# Patient Record
Sex: MALE | Race: OTHER | HISPANIC OR LATINO | URBAN - METROPOLITAN AREA
[De-identification: names, ages, dates, MRNs, and addresses within clinical notes are randomized per-mention and may not be internally consistent; named-entity substitution may affect disease eponyms.]

---

## 2022-03-14 PROBLEM — Z00.00 ENCOUNTER FOR PREVENTIVE HEALTH EXAMINATION: Status: ACTIVE | Noted: 2022-03-14

## 2022-03-22 ENCOUNTER — OUTPATIENT (OUTPATIENT)
Dept: OUTPATIENT SERVICES | Facility: HOSPITAL | Age: 32
LOS: 1 days | End: 2022-03-22
Payer: COMMERCIAL

## 2022-03-22 ENCOUNTER — APPOINTMENT (OUTPATIENT)
Dept: PEDIATRIC ALLERGY IMMUNOLOGY | Facility: CLINIC | Age: 32
End: 2022-03-22
Payer: COMMERCIAL

## 2022-03-22 VITALS
WEIGHT: 178 LBS | HEIGHT: 71 IN | BODY MASS INDEX: 24.92 KG/M2 | OXYGEN SATURATION: 99 % | DIASTOLIC BLOOD PRESSURE: 83 MMHG | HEART RATE: 85 BPM | SYSTOLIC BLOOD PRESSURE: 141 MMHG

## 2022-03-22 DIAGNOSIS — Z78.9 OTHER SPECIFIED HEALTH STATUS: ICD-10-CM

## 2022-03-22 DIAGNOSIS — Z00.00 ENCOUNTER FOR GENERAL ADULT MEDICAL EXAMINATION WITHOUT ABNORMAL FINDINGS: ICD-10-CM

## 2022-03-22 DIAGNOSIS — Z87.891 PERSONAL HISTORY OF NICOTINE DEPENDENCE: ICD-10-CM

## 2022-03-22 DIAGNOSIS — Z29.9 ENCOUNTER FOR PROPHYLACTIC MEASURES, UNSPECIFIED: ICD-10-CM

## 2022-03-22 DIAGNOSIS — Z83.438 FAMILY HISTORY OF OTHER DISORDER OF LIPOPROTEIN METABOLISM AND OTHER LIPIDEMIA: ICD-10-CM

## 2022-03-22 DIAGNOSIS — K21.9 GASTRO-ESOPHAGEAL REFLUX DISEASE W/OUT ESOPHAGITIS: ICD-10-CM

## 2022-03-22 PROCEDURE — 36415 COLL VENOUS BLD VENIPUNCTURE: CPT

## 2022-03-22 PROCEDURE — 99204 OFFICE O/P NEW MOD 45 MIN: CPT

## 2022-03-22 PROCEDURE — G0463: CPT

## 2022-03-22 NOTE — HISTORY OF PRESENT ILLNESS
[Not Applicable] : Not Applicable [No] : No [FreeTextEntry1] : LAURA BATES  is a 31 year  y/o HIV negative male here for a routine PrEP Initial.  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 \par \par Last HIV test:3 months ago with [facility] \par Hx of STIs: denies, full testing done end of December. \par \par Recent sexual history: Single, MSM, bottom. \par In the past 1 month 2 sexual partners and used condoms with one partner. \par Last sexual encounter was 2 weeks ago bottom w/o condom. \par Typically meets sexual partners through apps. \par \par Tattoos: denies \par IV drug use: denies \par Exchanged sex for money, drugs or housing: denies \par \par Once a  year in the winter goes to [place] and does annual physical. He prescribes omeprazole for GERD. \par \par No signs/symptoms of acute HIV. No fever, myalgias, throat pain, meningismus.

## 2022-03-22 NOTE — SOCIAL HISTORY
[Sexually Active] : The patient is sexually active [Age of First Sexual [place] ___] : became sexually active at the age of [unfilled] [Always] : always [Oral-Receptive (pt. mouth)] : oral-receptive (pt. mouth) [Anal-Receptive (pt anus)] : anal-receptive (pt anus) [To Pt Anus] : pt anus [To Pt Penis] : pt penis [Male ___] : [unfilled] male [Date of most recent sexual encounter ___] : ~His/Her~ most recent sexual encounter was [unfilled] [Partner Aware?] : Partner Aware? Yes [Monogamous] : is not monogamous [de-identified] : Bottom

## 2022-03-22 NOTE — DISCUSSION/SUMMARY
[15 min] : 15 min [HIV Education] : HIV Education [Transmission] : transmission [Universal Precautions] : universal precautions [Treatment Education] : treatment education [Anticipatory Guidance] : anticipatory guidance [Sexuality/Safer Sex] : sexuality/safer sex [Partner Notification Info/Discussion] : partner notification info/discussion [HIV info] : HIV info [Condoms] : condoms [Risk Reduction] : risk reduction [PrEP/PEP] : PrEP/PEP [The Topics Listed Above] : the topics listed above [The patient was able to ask questions and explain these concepts in his/her own words] : the patient was able to ask questions and explain these concepts in his/her own words

## 2022-03-23 LAB
25(OH)D3 SERPL-MCNC: 20.3 NG/ML
ALBUMIN SERPL ELPH-MCNC: 4.8 G/DL
ALP BLD-CCNC: 56 U/L
ALT SERPL-CCNC: 15 U/L
ANION GAP SERPL CALC-SCNC: 15 MMOL/L
APPEARANCE: CLEAR
AST SERPL-CCNC: 16 U/L
BASOPHILS # BLD AUTO: 0.01 K/UL
BASOPHILS NFR BLD AUTO: 0.3 %
BILIRUB SERPL-MCNC: 0.8 MG/DL
BILIRUBIN URINE: NEGATIVE
BLOOD URINE: NEGATIVE
BUN SERPL-MCNC: 15 MG/DL
C TRACH RRNA SPEC QL NAA+PROBE: NOT DETECTED
CALCIUM SERPL-MCNC: 9.6 MG/DL
CHLORIDE SERPL-SCNC: 102 MMOL/L
CO2 SERPL-SCNC: 22 MMOL/L
COLOR: YELLOW
CREAT SERPL-MCNC: 0.92 MG/DL
EGFR: 113 ML/MIN/1.73M2
EOSINOPHIL # BLD AUTO: 0.07 K/UL
EOSINOPHIL NFR BLD AUTO: 2.2 %
GLUCOSE QUALITATIVE U: NEGATIVE
HBV CORE IGG+IGM SER QL: NONREACTIVE
HBV SURFACE AB SERPL IA-ACNC: <3 MIU/ML
HBV SURFACE AG SER QL: NONREACTIVE
HCT VFR BLD CALC: 47.7 %
HCV AB SER QL: NONREACTIVE
HCV S/CO RATIO: 0.17 S/CO
HEPATITIS A IGG ANTIBODY: NONREACTIVE
HGB BLD-MCNC: 15.6 G/DL
HIV1 RNA # SERPL NAA+PROBE: NORMAL
HIV1 RNA # SERPL NAA+PROBE: NORMAL COPIES/ML
HIV1+2 AB SPEC QL IA.RAPID: NONREACTIVE
IMM GRANULOCYTES NFR BLD AUTO: 0 %
KETONES URINE: ABNORMAL
LEUKOCYTE ESTERASE URINE: NEGATIVE
LYMPHOCYTES # BLD AUTO: 1.29 K/UL
LYMPHOCYTES NFR BLD AUTO: 39.8 %
MAN DIFF?: NORMAL
MCHC RBC-ENTMCNC: 29.3 PG
MCHC RBC-ENTMCNC: 32.7 GM/DL
MCV RBC AUTO: 89.5 FL
MONOCYTES # BLD AUTO: 0.31 K/UL
MONOCYTES NFR BLD AUTO: 9.6 %
N GONORRHOEA RRNA SPEC QL NAA+PROBE: NOT DETECTED
NEUTROPHILS # BLD AUTO: 1.56 K/UL
NEUTROPHILS NFR BLD AUTO: 48.1 %
NITRITE URINE: NEGATIVE
PH URINE: 6.5
PLATELET # BLD AUTO: 240 K/UL
POTASSIUM SERPL-SCNC: 4.6 MMOL/L
PROT SERPL-MCNC: 7.3 G/DL
PROTEIN URINE: NORMAL
RBC # BLD: 5.33 M/UL
RBC # FLD: 12.7 %
SODIUM SERPL-SCNC: 138 MMOL/L
SOURCE AMPLIFICATION: NORMAL
SOURCE ANAL: NORMAL
SOURCE ORAL: NORMAL
SPECIFIC GRAVITY URINE: 1.03
T PALLIDUM AB SER QL IA: NEGATIVE
UROBILINOGEN URINE: NORMAL
VIRAL LOAD INTERP: NORMAL
VIRAL LOAD LOG: NORMAL LG COP/ML
WBC # FLD AUTO: 3.24 K/UL

## 2022-04-05 ENCOUNTER — TRANSCRIPTION ENCOUNTER (OUTPATIENT)
Age: 32
End: 2022-04-05

## 2022-04-19 ENCOUNTER — OUTPATIENT (OUTPATIENT)
Dept: OUTPATIENT SERVICES | Facility: HOSPITAL | Age: 32
LOS: 1 days | End: 2022-04-19
Payer: COMMERCIAL

## 2022-04-19 ENCOUNTER — APPOINTMENT (OUTPATIENT)
Dept: PEDIATRIC ALLERGY IMMUNOLOGY | Facility: CLINIC | Age: 32
End: 2022-04-19
Payer: COMMERCIAL

## 2022-04-19 DIAGNOSIS — Z00.00 ENCOUNTER FOR GENERAL ADULT MEDICAL EXAMINATION WITHOUT ABNORMAL FINDINGS: ICD-10-CM

## 2022-04-19 PROCEDURE — G0463: CPT

## 2022-04-19 PROCEDURE — 99213 OFFICE O/P EST LOW 20 MIN: CPT

## 2022-04-19 PROCEDURE — 36415 COLL VENOUS BLD VENIPUNCTURE: CPT

## 2022-04-19 NOTE — SOCIAL HISTORY
[Sexually Active] : The patient is sexually active [Age of First Sexual [place] ___] : became sexually active at the age of [unfilled] [Always] : always [Oral-Receptive (pt. mouth)] : oral-receptive (pt. mouth) [Anal-Receptive (pt anus)] : anal-receptive (pt anus) [To Pt Anus] : pt anus [To Pt Penis] : pt penis [Male ___] : [unfilled] male [Date of most recent sexual encounter ___] : ~His/Her~ most recent sexual encounter was [unfilled] [Partner Aware?] : Partner Aware? Yes [Monogamous] : is not monogamous [de-identified] : Bottom

## 2022-04-20 LAB
ALBUMIN SERPL ELPH-MCNC: 5 G/DL
ALP BLD-CCNC: 53 U/L
ALT SERPL-CCNC: 21 U/L
ANION GAP SERPL CALC-SCNC: 17 MMOL/L
APPEARANCE: CLEAR
AST SERPL-CCNC: 30 U/L
BASOPHILS # BLD AUTO: 0.01 K/UL
BASOPHILS NFR BLD AUTO: 0.3 %
BILIRUB SERPL-MCNC: 0.5 MG/DL
BILIRUBIN URINE: NEGATIVE
BLOOD URINE: NEGATIVE
BUN SERPL-MCNC: 14 MG/DL
C TRACH RRNA SPEC QL NAA+PROBE: NOT DETECTED
CALCIUM SERPL-MCNC: 9.6 MG/DL
CHLORIDE SERPL-SCNC: 104 MMOL/L
CO2 SERPL-SCNC: 20 MMOL/L
COLOR: YELLOW
CREAT SERPL-MCNC: 1.02 MG/DL
EGFR: 100 ML/MIN/1.73M2
EOSINOPHIL # BLD AUTO: 0.07 K/UL
EOSINOPHIL NFR BLD AUTO: 2.2 %
GLUCOSE QUALITATIVE U: NEGATIVE
HCT VFR BLD CALC: 50.1 %
HGB BLD-MCNC: 16 G/DL
HIV1 RNA # SERPL NAA+PROBE: NORMAL
HIV1 RNA # SERPL NAA+PROBE: NORMAL COPIES/ML
HIV1+2 AB SPEC QL IA.RAPID: NONREACTIVE
IMM GRANULOCYTES NFR BLD AUTO: 0 %
KETONES URINE: NEGATIVE
LEUKOCYTE ESTERASE URINE: NEGATIVE
LYMPHOCYTES # BLD AUTO: 1.31 K/UL
LYMPHOCYTES NFR BLD AUTO: 42 %
MAN DIFF?: NORMAL
MCHC RBC-ENTMCNC: 29.3 PG
MCHC RBC-ENTMCNC: 31.9 GM/DL
MCV RBC AUTO: 91.6 FL
MONOCYTES # BLD AUTO: 0.35 K/UL
MONOCYTES NFR BLD AUTO: 11.2 %
N GONORRHOEA RRNA SPEC QL NAA+PROBE: NOT DETECTED
NEUTROPHILS # BLD AUTO: 1.38 K/UL
NEUTROPHILS NFR BLD AUTO: 44.3 %
NITRITE URINE: NEGATIVE
PH URINE: 6
PLATELET # BLD AUTO: 258 K/UL
POTASSIUM SERPL-SCNC: 4.4 MMOL/L
PROT SERPL-MCNC: 7.6 G/DL
PROTEIN URINE: NORMAL
RBC # BLD: 5.47 M/UL
RBC # FLD: 13 %
SODIUM SERPL-SCNC: 141 MMOL/L
SOURCE AMPLIFICATION: NORMAL
SOURCE ANAL: NORMAL
SOURCE ORAL: NORMAL
SPECIFIC GRAVITY URINE: 1.03
T PALLIDUM AB SER QL IA: NEGATIVE
UROBILINOGEN URINE: NORMAL
VIRAL LOAD INTERP: NORMAL
VIRAL LOAD LOG: NORMAL LG COP/ML
WBC # FLD AUTO: 3.12 K/UL

## 2022-04-21 DIAGNOSIS — Z29.9 ENCOUNTER FOR PROPHYLACTIC MEASURES, UNSPECIFIED: ICD-10-CM

## 2022-07-20 ENCOUNTER — OUTPATIENT (OUTPATIENT)
Dept: OUTPATIENT SERVICES | Facility: HOSPITAL | Age: 32
LOS: 1 days | End: 2022-07-20
Payer: COMMERCIAL

## 2022-07-20 ENCOUNTER — APPOINTMENT (OUTPATIENT)
Dept: PEDIATRIC ALLERGY IMMUNOLOGY | Facility: CLINIC | Age: 32
End: 2022-07-20

## 2022-07-20 VITALS — HEART RATE: 86 BPM | OXYGEN SATURATION: 98 % | SYSTOLIC BLOOD PRESSURE: 135 MMHG | DIASTOLIC BLOOD PRESSURE: 91 MMHG

## 2022-07-20 DIAGNOSIS — Z00.00 ENCOUNTER FOR GENERAL ADULT MEDICAL EXAMINATION WITHOUT ABNORMAL FINDINGS: ICD-10-CM

## 2022-07-20 PROCEDURE — 99215 OFFICE O/P EST HI 40 MIN: CPT

## 2022-07-20 PROCEDURE — 36415 COLL VENOUS BLD VENIPUNCTURE: CPT

## 2022-07-20 PROCEDURE — G0463: CPT

## 2022-07-20 NOTE — SOCIAL HISTORY
[Monogamous] : monogamous [Sometimes] : sometimes [Oral-Receptive (pt. mouth)] : oral-receptive (pt. mouth) [Anal-Receptive (pt anus)] : anal-receptive (pt anus) [To Pt Genitalia] : pt genitalia [Male ___] : [unfilled] male [Date of most recent sexual encounter ___] : ~His/Her~ most recent sexual encounter was [unfilled] [Partnership for Health – Safe Sex Evidence Based Intervention] : The Partnership for Health Safe Sex Evidence Based Intervention was applied [Positive Reinforcement of Safe Behavior Message] : and a positive reinforcement of safe behavior message was provided. [de-identified] : MSM, bottom only  (dating 1 mo - monogiomous x 1 mo)

## 2022-07-20 NOTE — HISTORY OF PRESENT ILLNESS
[FreeTextEntry1] : LAURA BATES is a 31 year old, male seen on 07/20/2022 for  PrEP\par \par Pt had a problem with generic truvda and was able to get Descovy name brand with a copay assistance card. \par \par Adherance 100% no missed doses.  No side effects. \par Feels well.\par \par Thiniiing about monkeypox. no vaccine for now. \par \par

## 2022-07-21 DIAGNOSIS — Z29.9 ENCOUNTER FOR PROPHYLACTIC MEASURES, UNSPECIFIED: ICD-10-CM

## 2022-07-21 LAB
ALBUMIN SERPL ELPH-MCNC: 4.7 G/DL
ALP BLD-CCNC: 69 U/L
ALT SERPL-CCNC: 15 U/L
ANION GAP SERPL CALC-SCNC: 13 MMOL/L
APPEARANCE: CLEAR
AST SERPL-CCNC: 21 U/L
BILIRUB SERPL-MCNC: 0.2 MG/DL
BILIRUBIN URINE: NEGATIVE
BLOOD URINE: NEGATIVE
BUN SERPL-MCNC: 15 MG/DL
C TRACH RRNA SPEC QL NAA+PROBE: NOT DETECTED
C TRACH RRNA SPEC QL NAA+PROBE: NOT DETECTED
CALCIUM SERPL-MCNC: 9.7 MG/DL
CHLORIDE SERPL-SCNC: 103 MMOL/L
CO2 SERPL-SCNC: 22 MMOL/L
COLOR: YELLOW
CREAT SERPL-MCNC: 0.94 MG/DL
EGFR: 110 ML/MIN/1.73M2
GLUCOSE QUALITATIVE U: NEGATIVE
GLUCOSE SERPL-MCNC: 105 MG/DL
HIV1+2 AB SPEC QL IA.RAPID: NONREACTIVE
KETONES URINE: NEGATIVE
LEUKOCYTE ESTERASE URINE: NEGATIVE
N GONORRHOEA RRNA SPEC QL NAA+PROBE: NOT DETECTED
N GONORRHOEA RRNA SPEC QL NAA+PROBE: NOT DETECTED
NITRITE URINE: NEGATIVE
PH URINE: 6.5
POTASSIUM SERPL-SCNC: 4.1 MMOL/L
PROT SERPL-MCNC: 7.6 G/DL
PROTEIN URINE: NEGATIVE
SODIUM SERPL-SCNC: 138 MMOL/L
SOURCE AMPLIFICATION: NORMAL
SOURCE ANAL: NORMAL
SPECIFIC GRAVITY URINE: 1.02
T PALLIDUM AB SER QL IA: NEGATIVE
UROBILINOGEN URINE: NORMAL

## 2022-07-22 LAB
C TRACH RRNA SPEC QL NAA+PROBE: NOT DETECTED
HIV1 RNA # SERPL NAA+PROBE: NORMAL
HIV1 RNA # SERPL NAA+PROBE: NORMAL COPIES/ML
N GONORRHOEA RRNA SPEC QL NAA+PROBE: NOT DETECTED
SOURCE ORAL: NORMAL
VIRAL LOAD INTERP: NORMAL
VIRAL LOAD LOG: NORMAL LG COP/ML

## 2022-08-03 NOTE — HISTORY OF PRESENT ILLNESS
[Excellent] : Excellent [Percent Adherence: _____ %] : [unfilled]% adherence [No] : No [FreeTextEntry1] : LAURA BATES is a 31 year old, male seen on 04/19/2022 for  PrEP 1 month f/u. \par \par Adherence: Taking PrEP everyday, no missed doses. Taking in the afternoon. 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 \par \par Recent sexual history: Single, MSM, bottom. \par In the past 1 month no new sexual partners. \par Last sexual encounter was 1.5 months ago. \par Typically meets sexual partners through apps. \par \par Once a  year in the winter goes to [place] and does annual physical. He prescribes omeprazole for GERD. \par \par No signs/symptoms of acute HIV. No fever, myalgias, throat pain, meningismus.  yes

## 2022-08-18 ENCOUNTER — RX RENEWAL (OUTPATIENT)
Age: 32
End: 2022-08-18

## 2022-12-08 ENCOUNTER — RX RENEWAL (OUTPATIENT)
Age: 32
End: 2022-12-08

## 2022-12-08 ENCOUNTER — APPOINTMENT (OUTPATIENT)
Dept: PEDIATRIC ALLERGY IMMUNOLOGY | Facility: CLINIC | Age: 32
End: 2022-12-08

## 2023-01-04 ENCOUNTER — APPOINTMENT (OUTPATIENT)
Dept: PEDIATRIC ALLERGY IMMUNOLOGY | Facility: CLINIC | Age: 33
End: 2023-01-04
Payer: COMMERCIAL

## 2023-01-04 ENCOUNTER — OUTPATIENT (OUTPATIENT)
Dept: OUTPATIENT SERVICES | Facility: HOSPITAL | Age: 33
LOS: 1 days | End: 2023-01-04
Payer: COMMERCIAL

## 2023-01-04 VITALS
TEMPERATURE: 98 F | HEIGHT: 71 IN | WEIGHT: 168 LBS | OXYGEN SATURATION: 100 % | HEART RATE: 95 BPM | BODY MASS INDEX: 23.52 KG/M2 | DIASTOLIC BLOOD PRESSURE: 82 MMHG | SYSTOLIC BLOOD PRESSURE: 114 MMHG

## 2023-01-04 DIAGNOSIS — Z00.00 ENCOUNTER FOR GENERAL ADULT MEDICAL EXAMINATION WITHOUT ABNORMAL FINDINGS: ICD-10-CM

## 2023-01-04 DIAGNOSIS — Z78.9 OTHER SPECIFIED HEALTH STATUS: ICD-10-CM

## 2023-01-04 PROCEDURE — 99215 OFFICE O/P EST HI 40 MIN: CPT

## 2023-01-04 PROCEDURE — 87491 CHLMYD TRACH DNA AMP PROBE: CPT

## 2023-01-04 PROCEDURE — 82306 VITAMIN D 25 HYDROXY: CPT

## 2023-01-04 PROCEDURE — 87591 N.GONORRHOEAE DNA AMP PROB: CPT

## 2023-01-04 PROCEDURE — 87536 HIV-1 QUANT&REVRSE TRNSCRPJ: CPT

## 2023-01-04 PROCEDURE — G0463: CPT

## 2023-01-04 PROCEDURE — 86708 HEPATITIS A ANTIBODY: CPT

## 2023-01-04 PROCEDURE — 85025 COMPLETE CBC W/AUTO DIFF WBC: CPT

## 2023-01-04 PROCEDURE — 90715 TDAP VACCINE 7 YRS/> IM: CPT

## 2023-01-04 PROCEDURE — 90471 IMMUNIZATION ADMIN: CPT

## 2023-01-04 PROCEDURE — 80061 LIPID PANEL: CPT

## 2023-01-04 PROCEDURE — 86780 TREPONEMA PALLIDUM: CPT

## 2023-01-04 PROCEDURE — 86803 HEPATITIS C AB TEST: CPT

## 2023-01-04 PROCEDURE — 80053 COMPREHEN METABOLIC PANEL: CPT

## 2023-01-04 PROCEDURE — 84443 ASSAY THYROID STIM HORMONE: CPT

## 2023-01-04 PROCEDURE — 86706 HEP B SURFACE ANTIBODY: CPT

## 2023-01-04 PROCEDURE — 83036 HEMOGLOBIN GLYCOSYLATED A1C: CPT

## 2023-01-04 PROCEDURE — 87340 HEPATITIS B SURFACE AG IA: CPT

## 2023-01-04 PROCEDURE — 36415 COLL VENOUS BLD VENIPUNCTURE: CPT

## 2023-01-04 PROCEDURE — 86704 HEP B CORE ANTIBODY TOTAL: CPT

## 2023-01-04 PROCEDURE — 87389 HIV-1 AG W/HIV-1&-2 AB AG IA: CPT

## 2023-01-04 NOTE — SOCIAL HISTORY
[Monogamous] : monogamous [Sometimes] : sometimes [Oral-Receptive (pt. mouth)] : oral-receptive (pt. mouth) [Anal-Receptive (pt anus)] : anal-receptive (pt anus) [To Pt Genitalia] : pt genitalia [Male ___] : [unfilled] male [Date of most recent sexual encounter ___] : ~His/Her~ most recent sexual encounter was [unfilled] [Partnership for Health – Safe Sex Evidence Based Intervention] : The Partnership for Health Safe Sex Evidence Based Intervention was applied [Positive Reinforcement of Safe Behavior Message] : and a positive reinforcement of safe behavior message was provided. [de-identified] : MSM, bottom only  (dating 1 mo - monogiomous x 1 mo)

## 2023-01-04 NOTE — DISCUSSION/SUMMARY
[15 min] : 15 min [HIV Education] : HIV Education [Universal Precautions] : universal precautions [Treatment Education] : treatment education [Treatment Adherence] : treatment adherence [Anticipatory Guidance] : anticipatory guidance [Prognosis] : prognosis [Risk Reduction] : risk reduction [Pre-conception Counseling] : pre-conception counseling [Condoms] : condoms [PrEP/PEP] : PrEP/PEP

## 2023-01-04 NOTE — HISTORY OF PRESENT ILLNESS
[Excellent] : Excellent [No] : No [FreeTextEntry1] : LAURA BATES is a 32 year old, male seen on 01/04/2023 for  PrEp followup. \par \par Using  Descovy name brand with a copay assistance card. \par \par Adherence 100% no missed doses.  No side effects. \par \par Stopped takign it 15 days ago.   Pt kept getting 1 mo supply renewed.  Pt last seen in July 2022 and should not have had Rx renewed but it was renewed in error without labs; pt understands he needs to be seen every 3 mo going forward for labs at least.    Feels well.\par \par Thinking about monkeypox. no vaccine for now.   Refused Influenza vaccine.\par Interested in Tdap vaccine. \par \par Exercising daily 1.5 to 2 hrs a day both weights and cardio.  5'x a week

## 2023-01-05 DIAGNOSIS — Z29.9 ENCOUNTER FOR PROPHYLACTIC MEASURES, UNSPECIFIED: ICD-10-CM

## 2023-01-06 DIAGNOSIS — E78.00 PURE HYPERCHOLESTEROLEMIA, UNSPECIFIED: ICD-10-CM

## 2023-01-06 LAB
25(OH)D3 SERPL-MCNC: 24.4 NG/ML
ALBUMIN SERPL ELPH-MCNC: 4.7 G/DL
ALP BLD-CCNC: 54 U/L
ALT SERPL-CCNC: 21 U/L
ANAL PAP CYTOLOGY: NORMAL
ANION GAP SERPL CALC-SCNC: 12 MMOL/L
APPEARANCE: CLEAR
AST SERPL-CCNC: 22 U/L
BASOPHILS # BLD AUTO: 0.02 K/UL
BASOPHILS NFR BLD AUTO: 0.4 %
BILIRUB SERPL-MCNC: 0.3 MG/DL
BILIRUBIN URINE: NEGATIVE
BLOOD URINE: NEGATIVE
BUN SERPL-MCNC: 16 MG/DL
C TRACH RRNA SPEC QL NAA+PROBE: NOT DETECTED
CALCIUM SERPL-MCNC: 9.8 MG/DL
CHLORIDE SERPL-SCNC: 102 MMOL/L
CHOLEST SERPL-MCNC: 213 MG/DL
CO2 SERPL-SCNC: 25 MMOL/L
COLOR: COLORLESS
CREAT SERPL-MCNC: 0.92 MG/DL
EGFR: 113 ML/MIN/1.73M2
EOSINOPHIL # BLD AUTO: 0.08 K/UL
EOSINOPHIL NFR BLD AUTO: 1.6 %
ESTIMATED AVERAGE GLUCOSE: 105 MG/DL
GLUCOSE QUALITATIVE U: NEGATIVE
GLUCOSE SERPL-MCNC: 94 MG/DL
HBA1C MFR BLD HPLC: 5.3 %
HBV CORE IGG+IGM SER QL: NONREACTIVE
HBV SURFACE AB SERPL IA-ACNC: <3 MIU/ML
HBV SURFACE AG SER QL: NONREACTIVE
HCT VFR BLD CALC: 50 %
HCV AB SER QL: NONREACTIVE
HCV S/CO RATIO: 0.16 S/CO
HDLC SERPL-MCNC: 52 MG/DL
HEPATITIS A IGG ANTIBODY: NONREACTIVE
HGB BLD-MCNC: 16 G/DL
HIV1 RNA # SERPL NAA+PROBE: NORMAL
HIV1 RNA # SERPL NAA+PROBE: NORMAL COPIES/ML
HIV1+2 AB SPEC QL IA.RAPID: NONREACTIVE
IMM GRANULOCYTES NFR BLD AUTO: 0.4 %
KETONES URINE: NEGATIVE
LDLC SERPL CALC-MCNC: 141 MG/DL
LEUKOCYTE ESTERASE URINE: NEGATIVE
LYMPHOCYTES # BLD AUTO: 1.49 K/UL
LYMPHOCYTES NFR BLD AUTO: 30 %
MAN DIFF?: NORMAL
MCHC RBC-ENTMCNC: 30.4 PG
MCHC RBC-ENTMCNC: 32 GM/DL
MCV RBC AUTO: 95.1 FL
MONOCYTES # BLD AUTO: 0.33 K/UL
MONOCYTES NFR BLD AUTO: 6.7 %
N GONORRHOEA RRNA SPEC QL NAA+PROBE: NOT DETECTED
NEUTROPHILS # BLD AUTO: 3.02 K/UL
NEUTROPHILS NFR BLD AUTO: 60.9 %
NITRITE URINE: NEGATIVE
NONHDLC SERPL-MCNC: 160 MG/DL
PH URINE: 6.5
PLATELET # BLD AUTO: 295 K/UL
POTASSIUM SERPL-SCNC: 4.3 MMOL/L
PROT SERPL-MCNC: 7.5 G/DL
PROTEIN URINE: NEGATIVE
RBC # BLD: 5.26 M/UL
RBC # FLD: 12.4 %
SODIUM SERPL-SCNC: 139 MMOL/L
SOURCE AMPLIFICATION: NORMAL
SOURCE ANAL: NORMAL
SOURCE ORAL: NORMAL
SPECIFIC GRAVITY URINE: 1
T PALLIDUM AB SER QL IA: NEGATIVE
TRIGL SERPL-MCNC: 95 MG/DL
TSH SERPL-ACNC: 1.26 UIU/ML
UROBILINOGEN URINE: NORMAL
VIRAL LOAD INTERP: NORMAL
VIRAL LOAD LOG: NORMAL LG COP/ML
WBC # FLD AUTO: 4.96 K/UL

## 2023-04-11 ENCOUNTER — OUTPATIENT (OUTPATIENT)
Dept: OUTPATIENT SERVICES | Facility: HOSPITAL | Age: 33
LOS: 1 days | End: 2023-04-11
Payer: COMMERCIAL

## 2023-04-11 ENCOUNTER — APPOINTMENT (OUTPATIENT)
Dept: PEDIATRIC ALLERGY IMMUNOLOGY | Facility: CLINIC | Age: 33
End: 2023-04-11
Payer: COMMERCIAL

## 2023-04-11 VITALS
SYSTOLIC BLOOD PRESSURE: 134 MMHG | HEART RATE: 80 BPM | WEIGHT: 168 LBS | DIASTOLIC BLOOD PRESSURE: 87 MMHG | BODY MASS INDEX: 23.52 KG/M2 | HEIGHT: 71 IN | OXYGEN SATURATION: 97 %

## 2023-04-11 DIAGNOSIS — Z00.00 ENCOUNTER FOR GENERAL ADULT MEDICAL EXAMINATION WITHOUT ABNORMAL FINDINGS: ICD-10-CM

## 2023-04-11 PROCEDURE — 87536 HIV-1 QUANT&REVRSE TRNSCRPJ: CPT

## 2023-04-11 PROCEDURE — 90739 HEPB VACC 2/4 DOSE ADULT IM: CPT

## 2023-04-11 PROCEDURE — 99214 OFFICE O/P EST MOD 30 MIN: CPT

## 2023-04-11 PROCEDURE — 86780 TREPONEMA PALLIDUM: CPT

## 2023-04-11 PROCEDURE — 87591 N.GONORRHOEAE DNA AMP PROB: CPT

## 2023-04-11 PROCEDURE — 85025 COMPLETE CBC W/AUTO DIFF WBC: CPT

## 2023-04-11 PROCEDURE — 90651 9VHPV VACCINE 2/3 DOSE IM: CPT

## 2023-04-11 PROCEDURE — 87389 HIV-1 AG W/HIV-1&-2 AB AG IA: CPT

## 2023-04-11 PROCEDURE — 87491 CHLMYD TRACH DNA AMP PROBE: CPT

## 2023-04-11 PROCEDURE — G0463: CPT

## 2023-04-11 PROCEDURE — 36415 COLL VENOUS BLD VENIPUNCTURE: CPT

## 2023-04-11 RX ORDER — OMEPRAZOLE 20 MG/1
20 CAPSULE, DELAYED RELEASE ORAL
Refills: 0 | Status: COMPLETED | COMMUNITY
End: 2023-04-11

## 2023-04-11 NOTE — SOCIAL HISTORY
[Sexually Active] : The patient is sexually active [Age of First Sexual [place] ___] : became sexually active at the age of [unfilled] [Always] : always [Oral-Receptive (pt. mouth)] : oral-receptive (pt. mouth) [Anal-Receptive (pt anus)] : anal-receptive (pt anus) [To Pt Anus] : pt anus [To Pt Penis] : pt penis [Partner Aware?] : Partner Aware? Yes [Male ___] : [unfilled] male [Date of most recent sexual encounter ___] : ~His/Her~ most recent sexual encounter was [unfilled] [Monogamous] : is not monogamous [de-identified] : Bottom

## 2023-04-11 NOTE — HISTORY OF PRESENT ILLNESS
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 Liked dogs most to start but now likes cats\par \par Recent sexual history: Single, MSM, bottom.  In the past 3 months 3 sexual partners. Last sexual encounter was 1.5 weeks ago\par \par Once a  year in the winter goes to [place] and does annual physical. He prescribes omeprazole for GERD. \par \par Patient reports he cold last week, covid negative. He reports 4-5 days had nasal congestion, resolved \par \par No signs/symptoms of acute HIV. No fever, myalgias, throat pain, meningismus. \par Pt denies any SOB, cough, chest pain, palpitations, N/V/D/C, fever, or chills. No other health concerns today.

## 2023-04-11 NOTE — ASSESSMENT
[FreeTextEntry1] : \par Blood work was collected in office at time of visit. \par \par HPV and HBV vaccine #1 given at time of visit - counseled. #2 given in 1 month, #3 given at 6 months. Advised patient to call with any questions or concerns. Patient verbalized understanding. \par \par HTN: bp elevated today, fmh of htn. Patient reports stopping med d/t lifestyle modifications, advised to restart. Sent in rx. Will order routine blood work and f/u results to patient once made available. F/u 3 months. Advised patient to call with any questions or concerns. Patient verbalized understanding. \par \par Discussed abnormal pap smear with pt, to repeat again in 6 months. Advised patient to call with any questions or concerns. Patient verbalized understanding. \par \par Discussed with patient HPV vaccine is approved for up to age 45, men and women. HPV vaccine covers dangerous strains of HPV that cause cervical cancer, anal cancers and a significant proportion of oropharyngeal cancer, vulvar and vaginal cancer, penile cancer along with anogenital warts.\par \par Counseled patient it is best to get vaccine at younger age prior to sexual intercourse/multiple sexual partners, but can still provide coverage against HPV even after this. It is not contraindicated if you have had HPV before, can offer protection against these strains and other strains you may not have been exposed to.\par \par Side effects include injection site redness and soreness, cannot get if you have a yeast allergy.\par \par Advised patient to call with questions/concerns. Patient verbalized understanding.\par \par \par \par \par Individuals initiating the vaccine series at 15 years of age or older - Three doses of HPV vaccine should be given at 0, 1 to 2 (typically 2), and 6 months.\par \par Missed doses - Patients often do not follow up for their immunizations on schedule. The ACIP recommends that if the vaccination series is interrupted for any length of time, it can be resumed without restarting the series.\par \par If patient had quadrivalent Gardasil HPV vaccine in past, it is okay to finish series with Gardasil-9.

## 2023-04-11 NOTE — PHYSICAL EXAM
[Alert] : alert [Well Nourished] : well nourished [Healthy Appearance] : healthy appearance [No Acute Distress] : no acute distress [Well Developed] : well developed [Normal Pupil & Iris Size/Symmetry] : normal pupil and iris size and symmetry [No Discharge] : no discharge [No Photophobia] : no photophobia [Sclera Not Icteric] : sclera not icteric [No LAD] : no lymphadenopathy [Supple] : the neck was supple [Normal Rate and Effort] : normal respiratory rhythm and effort [No Crackles] : no crackles [No Retractions] : no retractions [Bilateral Audible Breath Sounds] : bilateral audible breath sounds [Normal Rate] : heart rate was normal  [Normal S1, S2] : normal S1 and S2 [No murmur] : no murmur [Regular Rhythm] : with a regular rhythm [Soft] : abdomen soft [Not Tender] : non-tender [Not Distended] : not distended [No HSM] : no hepato-splenomegaly [Normal Cervical Lymph Nodes] : cervical [Skin Intact] : skin intact  [No Rash] : no rash [No Skin Lesions] : no skin lesions [No clubbing] : no clubbing [No Edema] : no edema [No Cyanosis] : no cyanosis [Normal Mood] : mood was normal [Normal Affect] : affect was normal [Alert, Awake, Oriented as Age-Appropriate] : alert, awake, oriented as age appropriate

## 2023-04-12 ENCOUNTER — TRANSCRIPTION ENCOUNTER (OUTPATIENT)
Age: 33
End: 2023-04-12

## 2023-04-12 DIAGNOSIS — E87.5 HYPERKALEMIA: ICD-10-CM

## 2023-04-12 DIAGNOSIS — Z29.9 ENCOUNTER FOR PROPHYLACTIC MEASURES, UNSPECIFIED: ICD-10-CM

## 2023-04-12 DIAGNOSIS — Z11.3 ENCOUNTER FOR SCREENING FOR INFECTIONS WITH A PREDOMINANTLY SEXUAL MODE OF TRANSMISSION: ICD-10-CM

## 2023-04-12 LAB
ALBUMIN SERPL ELPH-MCNC: 4.9 G/DL
ALP BLD-CCNC: 58 U/L
ALT SERPL-CCNC: 11 U/L
ANION GAP SERPL CALC-SCNC: 16 MMOL/L
APPEARANCE: CLEAR
AST SERPL-CCNC: 17 U/L
BASOPHILS # BLD AUTO: 0.02 K/UL
BASOPHILS NFR BLD AUTO: 0.4 %
BILIRUB SERPL-MCNC: 0.3 MG/DL
BILIRUBIN URINE: NEGATIVE
BLOOD URINE: NEGATIVE
BUN SERPL-MCNC: 14 MG/DL
C TRACH RRNA SPEC QL NAA+PROBE: NOT DETECTED
CALCIUM SERPL-MCNC: 10.1 MG/DL
CHLORIDE SERPL-SCNC: 101 MMOL/L
CO2 SERPL-SCNC: 22 MMOL/L
COLOR: COLORLESS
CREAT SERPL-MCNC: 0.85 MG/DL
EGFR: 118 ML/MIN/1.73M2
EOSINOPHIL # BLD AUTO: 0.06 K/UL
EOSINOPHIL NFR BLD AUTO: 1.3 %
GLUCOSE QUALITATIVE U: NEGATIVE
HCT VFR BLD CALC: 49 %
HGB BLD-MCNC: 15.6 G/DL
HIV1 RNA # SERPL NAA+PROBE: NORMAL
HIV1 RNA # SERPL NAA+PROBE: NORMAL COPIES/ML
HIV1+2 AB SPEC QL IA.RAPID: NONREACTIVE
IMM GRANULOCYTES NFR BLD AUTO: 0.2 %
KETONES URINE: NEGATIVE
LEUKOCYTE ESTERASE URINE: NEGATIVE
LYMPHOCYTES # BLD AUTO: 1.21 K/UL
LYMPHOCYTES NFR BLD AUTO: 26.2 %
MAN DIFF?: NORMAL
MCHC RBC-ENTMCNC: 30.2 PG
MCHC RBC-ENTMCNC: 31.8 GM/DL
MCV RBC AUTO: 95 FL
MONOCYTES # BLD AUTO: 0.34 K/UL
MONOCYTES NFR BLD AUTO: 7.4 %
N GONORRHOEA RRNA SPEC QL NAA+PROBE: NOT DETECTED
NEUTROPHILS # BLD AUTO: 2.98 K/UL
NEUTROPHILS NFR BLD AUTO: 64.5 %
NITRITE URINE: NEGATIVE
PH URINE: 7
PLATELET # BLD AUTO: 250 K/UL
POTASSIUM SERPL-SCNC: 5.5 MMOL/L
PROT SERPL-MCNC: 7.5 G/DL
PROTEIN URINE: NEGATIVE
RBC # BLD: 5.16 M/UL
RBC # FLD: 12.5 %
SODIUM SERPL-SCNC: 140 MMOL/L
SOURCE AMPLIFICATION: NORMAL
SPECIFIC GRAVITY URINE: 1.01
T PALLIDUM AB SER QL IA: NEGATIVE
UROBILINOGEN URINE: NORMAL
VIRAL LOAD INTERP: NORMAL
VIRAL LOAD LOG: NORMAL LG COP/ML
WBC # FLD AUTO: 4.62 K/UL

## 2023-04-13 LAB
C TRACH RRNA SPEC QL NAA+PROBE: NOT DETECTED
C TRACH RRNA SPEC QL NAA+PROBE: NOT DETECTED
N GONORRHOEA RRNA SPEC QL NAA+PROBE: NOT DETECTED
N GONORRHOEA RRNA SPEC QL NAA+PROBE: NOT DETECTED
SOURCE ANAL: NORMAL
SOURCE ORAL: NORMAL

## 2023-05-03 ENCOUNTER — OUTPATIENT (OUTPATIENT)
Dept: OUTPATIENT SERVICES | Facility: HOSPITAL | Age: 33
LOS: 1 days | End: 2023-05-03
Payer: COMMERCIAL

## 2023-05-03 ENCOUNTER — APPOINTMENT (OUTPATIENT)
Dept: PEDIATRIC ALLERGY IMMUNOLOGY | Facility: CLINIC | Age: 33
End: 2023-05-03
Payer: COMMERCIAL

## 2023-05-03 VITALS
HEIGHT: 71 IN | DIASTOLIC BLOOD PRESSURE: 80 MMHG | WEIGHT: 165 LBS | OXYGEN SATURATION: 99 % | BODY MASS INDEX: 23.1 KG/M2 | TEMPERATURE: 98.2 F | SYSTOLIC BLOOD PRESSURE: 133 MMHG | HEART RATE: 79 BPM

## 2023-05-03 DIAGNOSIS — L02.31 CUTANEOUS ABSCESS OF BUTTOCK: ICD-10-CM

## 2023-05-03 DIAGNOSIS — L73.9 FOLLICULAR DISORDER, UNSPECIFIED: ICD-10-CM

## 2023-05-03 DIAGNOSIS — R36.9 URETHRAL DISCHARGE, UNSPECIFIED: ICD-10-CM

## 2023-05-03 DIAGNOSIS — Z00.00 ENCOUNTER FOR GENERAL ADULT MEDICAL EXAMINATION WITHOUT ABNORMAL FINDINGS: ICD-10-CM

## 2023-05-03 PROCEDURE — 36415 COLL VENOUS BLD VENIPUNCTURE: CPT

## 2023-05-03 PROCEDURE — 87591 N.GONORRHOEAE DNA AMP PROB: CPT

## 2023-05-03 PROCEDURE — 99215 OFFICE O/P EST HI 40 MIN: CPT

## 2023-05-03 PROCEDURE — 81001 URINALYSIS AUTO W/SCOPE: CPT

## 2023-05-03 PROCEDURE — 80048 BASIC METABOLIC PNL TOTAL CA: CPT

## 2023-05-03 PROCEDURE — G0463: CPT

## 2023-05-03 PROCEDURE — 87536 HIV-1 QUANT&REVRSE TRNSCRPJ: CPT

## 2023-05-03 PROCEDURE — 87491 CHLMYD TRACH DNA AMP PROBE: CPT

## 2023-05-03 PROCEDURE — 80074 ACUTE HEPATITIS PANEL: CPT

## 2023-05-03 PROCEDURE — 87389 HIV-1 AG W/HIV-1&-2 AB AG IA: CPT

## 2023-05-03 PROCEDURE — 86780 TREPONEMA PALLIDUM: CPT

## 2023-05-03 NOTE — SOCIAL HISTORY
[Monogamous] : monogamous [Sometimes] : sometimes [Oral-Receptive (pt. mouth)] : oral-receptive (pt. mouth) [Anal-Receptive (pt anus)] : anal-receptive (pt anus) [To Pt Genitalia] : pt genitalia [Male ___] : [unfilled] male [Date of most recent sexual encounter ___] : ~His/Her~ most recent sexual encounter was [unfilled] [Partnership for Health – Safe Sex Evidence Based Intervention] : The Partnership for Health Safe Sex Evidence Based Intervention was applied [Positive Reinforcement of Safe Behavior Message] : and a positive reinforcement of safe behavior message was provided. [de-identified] : MSM, bottom only  (dating 1 mo - monogiomous x 1 mo)

## 2023-05-03 NOTE — PHYSICAL EXAM
[Alert] : alert [Well Nourished] : well nourished [Healthy Appearance] : healthy appearance [No Acute Distress] : no acute distress [Well Developed] : well developed [Normal Pupil & Iris Size/Symmetry] : normal pupil and iris size and symmetry [No Discharge] : no discharge [No Photophobia] : no photophobia [Sclera Not Icteric] : sclera not icteric [Normal TMs] : both tympanic membranes were normal [Normal Nasal Mucosa] : the nasal mucosa was normal [Normal Lips/Tongue] : the lips and tongue were normal [Normal Outer Ear/Nose] : the ears and nose were normal in appearance [Normal Tonsils] : normal tonsils [No Thrush] : no thrush [Pale mucosa] : pale mucosa [Supple] : the neck was supple [Normal Rate and Effort] : normal respiratory rhythm and effort [No Crackles] : no crackles [No Retractions] : no retractions [Bilateral Audible Breath Sounds] : bilateral audible breath sounds [Normal Rate] : heart rate was normal  [Normal S1, S2] : normal S1 and S2 [No murmur] : no murmur [Regular Rhythm] : with a regular rhythm [Soft] : abdomen soft [Not Tender] : non-tender [Not Distended] : not distended [No HSM] : no hepato-splenomegaly [Normal Cervical Lymph Nodes] : cervical [Skin Intact] : skin intact  [No Rash] : no rash [No Skin Lesions] : no skin lesions [No clubbing] : no clubbing [No Edema] : no edema [No Cyanosis] : no cyanosis [Normal Mood] : mood was normal [Normal Affect] : affect was normal [Alert, Awake, Oriented as Age-Appropriate] : alert, awake, oriented as age appropriate [de-identified] : right buttocks at 5:00  - abcess approx 4x4cm with localized erythema and superficial subcutansous blood about 1 cm in size at the apex of the lesion.

## 2023-05-03 NOTE — HISTORY OF PRESENT ILLNESS
[FreeTextEntry1] : LAURA BATES is a 32 year old, male seen on 05/03/2023 for   PrEp followup. \par \par Pt developed a penile discharge which was white/yellow/green starting yesterday x 1 only.  Resolved overnight, and today, he has a little of a whilte discharge.  No problems with first urine.\par \par Pt has pain in the right buttocks and has developed a large inflamed pimple there as well starting on Sunday 4/31/23, .  No necrosis. No fever.  No signs of sepsis.  BP stable.  HR stable. \par \par Using  Descovy name brand with a copay assistance card. \par Adherence 100% no missed doses.  No side effects. \par \par Last sex: 1.5 week ago ; bottom\par \par Exercising daily 1.5 to 2 hrs a day both weights and cardio.  5'x a week \par \par Generally tired.  Sleeping more than usual.

## 2023-05-04 DIAGNOSIS — Z29.9 ENCOUNTER FOR PROPHYLACTIC MEASURES, UNSPECIFIED: ICD-10-CM

## 2023-05-09 ENCOUNTER — APPOINTMENT (OUTPATIENT)
Dept: PEDIATRIC ALLERGY IMMUNOLOGY | Facility: CLINIC | Age: 33
End: 2023-05-09

## 2023-05-10 LAB
ANION GAP SERPL CALC-SCNC: 15 MMOL/L
APPEARANCE: CLEAR
BACTERIA: NEGATIVE /HPF
BILIRUBIN URINE: NEGATIVE
BLOOD URINE: NEGATIVE
BUN SERPL-MCNC: 13 MG/DL
C TRACH RRNA SPEC QL NAA+PROBE: NOT DETECTED
CALCIUM SERPL-MCNC: 9.7 MG/DL
CAST: 0 /LPF
CHLORIDE SERPL-SCNC: 102 MMOL/L
CO2 SERPL-SCNC: 22 MMOL/L
COLOR: YELLOW
CREAT SERPL-MCNC: 0.85 MG/DL
EGFR: 118 ML/MIN/1.73M2
EPITHELIAL CELLS: 0 /HPF
GLUCOSE QUALITATIVE U: NEGATIVE MG/DL
GLUCOSE SERPL-MCNC: 100 MG/DL
HAV IGM SER QL: NONREACTIVE
HBV CORE IGM SER QL: NONREACTIVE
HBV SURFACE AG SER QL: NONREACTIVE
HCV AB SER QL: NONREACTIVE
HCV S/CO RATIO: 0.18 S/CO
HIV1 RNA # SERPL NAA+PROBE: NORMAL
HIV1 RNA # SERPL NAA+PROBE: NORMAL COPIES/ML
HIV1+2 AB SPEC QL IA.RAPID: NONREACTIVE
KETONES URINE: NEGATIVE MG/DL
LEUKOCYTE ESTERASE URINE: ABNORMAL
MICROSCOPIC-UA: NORMAL
N GONORRHOEA RRNA SPEC QL NAA+PROBE: NOT DETECTED
NITRITE URINE: NEGATIVE
PH URINE: 6.5
POTASSIUM SERPL-SCNC: 4.4 MMOL/L
PROTEIN URINE: NEGATIVE MG/DL
RED BLOOD CELLS URINE: 0 /HPF
SODIUM SERPL-SCNC: 139 MMOL/L
SOURCE AMPLIFICATION: NORMAL
SOURCE ANAL: NORMAL
SOURCE ORAL: NORMAL
SPECIFIC GRAVITY URINE: 1.01
T PALLIDUM AB SER QL IA: NEGATIVE
UROBILINOGEN URINE: 0.2 MG/DL
VIRAL LOAD INTERP: NORMAL
VIRAL LOAD LOG: NORMAL LG COP/ML
WHITE BLOOD CELLS URINE: 0 /HPF

## 2023-05-19 ENCOUNTER — APPOINTMENT (OUTPATIENT)
Dept: PEDIATRIC ALLERGY IMMUNOLOGY | Facility: CLINIC | Age: 33
End: 2023-05-19

## 2023-07-18 ENCOUNTER — OUTPATIENT (OUTPATIENT)
Dept: OUTPATIENT SERVICES | Facility: HOSPITAL | Age: 33
LOS: 1 days | End: 2023-07-18
Payer: COMMERCIAL

## 2023-07-18 ENCOUNTER — APPOINTMENT (OUTPATIENT)
Dept: PEDIATRIC ALLERGY IMMUNOLOGY | Facility: CLINIC | Age: 33
End: 2023-07-18
Payer: COMMERCIAL

## 2023-07-18 VITALS
WEIGHT: 165 LBS | HEART RATE: 80 BPM | SYSTOLIC BLOOD PRESSURE: 133 MMHG | DIASTOLIC BLOOD PRESSURE: 82 MMHG | HEIGHT: 71 IN | OXYGEN SATURATION: 97 % | BODY MASS INDEX: 23.1 KG/M2

## 2023-07-18 DIAGNOSIS — Z00.00 ENCOUNTER FOR GENERAL ADULT MEDICAL EXAMINATION WITHOUT ABNORMAL FINDINGS: ICD-10-CM

## 2023-07-18 DIAGNOSIS — I10 ESSENTIAL (PRIMARY) HYPERTENSION: ICD-10-CM

## 2023-07-18 PROCEDURE — 36415 COLL VENOUS BLD VENIPUNCTURE: CPT

## 2023-07-18 PROCEDURE — 87491 CHLMYD TRACH DNA AMP PROBE: CPT

## 2023-07-18 PROCEDURE — 90739 HEPB VACC 2/4 DOSE ADULT IM: CPT

## 2023-07-18 PROCEDURE — 87591 N.GONORRHOEAE DNA AMP PROB: CPT

## 2023-07-18 PROCEDURE — 85025 COMPLETE CBC W/AUTO DIFF WBC: CPT

## 2023-07-18 PROCEDURE — 99214 OFFICE O/P EST MOD 30 MIN: CPT

## 2023-07-18 PROCEDURE — 87389 HIV-1 AG W/HIV-1&-2 AB AG IA: CPT

## 2023-07-18 PROCEDURE — 87536 HIV-1 QUANT&REVRSE TRNSCRPJ: CPT

## 2023-07-18 PROCEDURE — 90471 IMMUNIZATION ADMIN: CPT

## 2023-07-18 PROCEDURE — 86780 TREPONEMA PALLIDUM: CPT

## 2023-07-18 PROCEDURE — G0463: CPT

## 2023-07-18 RX ORDER — CEPHALEXIN 500 MG/1
500 CAPSULE ORAL 4 TIMES DAILY
Qty: 40 | Refills: 0 | Status: COMPLETED | COMMUNITY
Start: 2023-05-03 | End: 2023-07-18

## 2023-07-18 RX ORDER — ENALAPRIL MALEATE 5 MG/1
5 TABLET ORAL
Qty: 30 | Refills: 2 | Status: COMPLETED | COMMUNITY
Start: 2022-07-20 | End: 2023-07-18

## 2023-07-18 RX ORDER — MUPIROCIN 20 MG/G
2 OINTMENT TOPICAL 3 TIMES DAILY
Qty: 1 | Refills: 0 | Status: COMPLETED | COMMUNITY
Start: 2023-05-03 | End: 2023-07-18

## 2023-07-18 NOTE — ASSESSMENT
[FreeTextEntry1] : \par Blood work was collected in office at time of visit. \par \par Patient given hepatitis B injection #2 07/18/2023  - counseled. Will need injection #3 at 6 months. Advised patient to call with any questions or concerns. Patient verbalized understanding. \par \par HTN: Discussed with patient blood pressure is >130/80. Advised patient to implement lifestyle modifications with diet and exercise, low sodium diet. Can order blood pressure cuff and keep log of bp readings at home. Provided patient with educational handouts regarding htn and indications for hypertensive emergency. If elevated at next visit, will start medication. Educated pt to go to hospital with any worst headache of life/thunderclap headache, gait disturbances, chest pain, palpitations, acute neuro changes (slurred speech, muscle weakness, numbness/tingling, visual disturbances etc.) .Advised patient to call with any questions or concerns. Patient verbalized understanding. \par \par No HPV in stock today, pt to message  about getting #2 next week.\par \par Discussed with patient HPV vaccine is approved for up to age 45, men and women. HPV vaccine covers dangerous strains of HPV that cause cervical cancer, anal cancers and a significant proportion of oropharyngeal cancer, vulvar and vaginal cancer, penile cancer along with anogenital warts.\par \par Counseled patient it is best to get vaccine at younger age prior to sexual intercourse/multiple sexual partners, but can still provide coverage against HPV even after this. It is not contraindicated if you have had HPV before, can offer protection against these strains and other strains you may not have been exposed to.\par \par Side effects include injection site redness and soreness, cannot get if you have a yeast allergy.\par \par Advised patient to call with questions/concerns. Patient verbalized understanding.\par \par \par \par \par Individuals initiating the vaccine series at 15 years of age or older - Three doses of HPV vaccine should be given at 0, 1 to 2 (typically 2), and 6 months.\par \par Missed doses - Patients often do not follow up for their immunizations on schedule. The ACIP recommends that if the vaccination series is interrupted for any length of time, it can be resumed without restarting the series.\par \par If patient had quadrivalent Gardasil HPV vaccine in past, it is okay to finish series with Gardasil-9.

## 2023-07-18 NOTE — HISTORY OF PRESENT ILLNESS
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 Liked dogs most to start but now likes cats.Going to friends house after this for her birthday dinner, have been friends a long time, looking forward to it\par \par Recent sexual history: dating someone for 4 months closed currently, MSM, bottom. In the past 3 months 1 sexual partners. Last sexual encounter was last week\par \par Once a  year in the winter goes to [place] and does annual physical. He prescribes omeprazole for GERD. NOt taking blood pressure medication\par \par No signs/symptoms of acute HIV. No fever, myalgias, throat pain, meningismus. \par Pt denies any SOB, cough, chest pain, palpitations, N/V/D/C, fever, or chills. No other health concerns today.

## 2023-07-18 NOTE — SOCIAL HISTORY
[Monogamous] : is not monogamous [Male ___] : [unfilled] male [Date of most recent sexual encounter ___] : ~His/Her~ most recent sexual encounter was [unfilled] [de-identified] : Bottom

## 2023-07-19 LAB
ALBUMIN SERPL ELPH-MCNC: 4.8 G/DL
ALP BLD-CCNC: 55 U/L
ALT SERPL-CCNC: 14 U/L
ANION GAP SERPL CALC-SCNC: 16 MMOL/L
APPEARANCE: CLEAR
AST SERPL-CCNC: 21 U/L
BILIRUB SERPL-MCNC: 0.3 MG/DL
BILIRUBIN URINE: NEGATIVE
BLOOD URINE: NEGATIVE
BUN SERPL-MCNC: 17 MG/DL
C TRACH RRNA SPEC QL NAA+PROBE: NOT DETECTED
C TRACH RRNA SPEC QL NAA+PROBE: NOT DETECTED
CALCIUM SERPL-MCNC: 9.5 MG/DL
CHLORIDE SERPL-SCNC: 102 MMOL/L
CO2 SERPL-SCNC: 22 MMOL/L
COLOR: YELLOW
CREAT SERPL-MCNC: 1.22 MG/DL
EGFR: 80 ML/MIN/1.73M2
GLUCOSE QUALITATIVE U: NEGATIVE MG/DL
HIV1 RNA # SERPL NAA+PROBE: NORMAL
HIV1 RNA # SERPL NAA+PROBE: NORMAL COPIES/ML
HIV1+2 AB SPEC QL IA.RAPID: NONREACTIVE
KETONES URINE: NEGATIVE MG/DL
LEUKOCYTE ESTERASE URINE: NEGATIVE
N GONORRHOEA RRNA SPEC QL NAA+PROBE: NOT DETECTED
N GONORRHOEA RRNA SPEC QL NAA+PROBE: NOT DETECTED
NITRITE URINE: NEGATIVE
PH URINE: 6.5
POTASSIUM SERPL-SCNC: 4.2 MMOL/L
PROT SERPL-MCNC: 7.3 G/DL
PROTEIN URINE: NEGATIVE MG/DL
SODIUM SERPL-SCNC: 140 MMOL/L
SOURCE ANAL: NORMAL
SOURCE ORAL: NORMAL
SPECIFIC GRAVITY URINE: 1
T PALLIDUM AB SER QL IA: NEGATIVE
UROBILINOGEN URINE: 0.2 MG/DL
VIRAL LOAD INTERP: NORMAL
VIRAL LOAD LOG: NORMAL LG COP/ML

## 2023-07-20 DIAGNOSIS — Z29.9 ENCOUNTER FOR PROPHYLACTIC MEASURES, UNSPECIFIED: ICD-10-CM

## 2023-07-20 LAB
C TRACH RRNA SPEC QL NAA+PROBE: NOT DETECTED
N GONORRHOEA RRNA SPEC QL NAA+PROBE: NOT DETECTED
SOURCE AMPLIFICATION: NORMAL

## 2023-07-21 LAB — ANAL PAP CYTOLOGY: NORMAL

## 2023-08-02 ENCOUNTER — APPOINTMENT (OUTPATIENT)
Dept: PEDIATRIC ALLERGY IMMUNOLOGY | Facility: CLINIC | Age: 33
End: 2023-08-02

## 2023-08-02 DIAGNOSIS — Z12.12 ENCOUNTER FOR SCREENING FOR MALIGNANT NEOPLASM OF RECTUM: ICD-10-CM

## 2023-10-17 ENCOUNTER — APPOINTMENT (OUTPATIENT)
Dept: PEDIATRIC ALLERGY IMMUNOLOGY | Facility: CLINIC | Age: 33
End: 2023-10-17

## 2023-10-17 ENCOUNTER — OUTPATIENT (OUTPATIENT)
Dept: OUTPATIENT SERVICES | Facility: HOSPITAL | Age: 33
LOS: 1 days | End: 2023-10-17

## 2023-10-17 DIAGNOSIS — Z00.00 ENCOUNTER FOR GENERAL ADULT MEDICAL EXAMINATION WITHOUT ABNORMAL FINDINGS: ICD-10-CM

## 2023-10-18 ENCOUNTER — MED ADMIN CHARGE (OUTPATIENT)
Age: 33
End: 2023-10-18

## 2023-10-18 ENCOUNTER — OUTPATIENT (OUTPATIENT)
Dept: OUTPATIENT SERVICES | Facility: HOSPITAL | Age: 33
LOS: 1 days | End: 2023-10-18
Payer: COMMERCIAL

## 2023-10-18 ENCOUNTER — APPOINTMENT (OUTPATIENT)
Dept: PEDIATRIC ALLERGY IMMUNOLOGY | Facility: CLINIC | Age: 33
End: 2023-10-18
Payer: COMMERCIAL

## 2023-10-18 VITALS
TEMPERATURE: 98.1 F | DIASTOLIC BLOOD PRESSURE: 78 MMHG | SYSTOLIC BLOOD PRESSURE: 118 MMHG | BODY MASS INDEX: 23.38 KG/M2 | OXYGEN SATURATION: 99 % | HEART RATE: 77 BPM | WEIGHT: 167 LBS | HEIGHT: 71 IN

## 2023-10-18 DIAGNOSIS — Z11.4 ENCOUNTER FOR SCREENING FOR HUMAN IMMUNODEFICIENCY VIRUS [HIV]: ICD-10-CM

## 2023-10-18 DIAGNOSIS — Z23 ENCOUNTER FOR IMMUNIZATION: ICD-10-CM

## 2023-10-18 DIAGNOSIS — Z00.00 ENCOUNTER FOR GENERAL ADULT MEDICAL EXAMINATION WITHOUT ABNORMAL FINDINGS: ICD-10-CM

## 2023-10-18 PROCEDURE — 99215 OFFICE O/P EST HI 40 MIN: CPT | Mod: 25

## 2023-10-18 PROCEDURE — 90651 9VHPV VACCINE 2/3 DOSE IM: CPT

## 2023-10-18 PROCEDURE — 85025 COMPLETE CBC W/AUTO DIFF WBC: CPT

## 2023-10-18 PROCEDURE — 90472 IMMUNIZATION ADMIN EACH ADD: CPT

## 2023-10-18 PROCEDURE — 90739 HEPB VACC 2/4 DOSE ADULT IM: CPT

## 2023-10-18 PROCEDURE — 87389 HIV-1 AG W/HIV-1&-2 AB AG IA: CPT

## 2023-10-18 PROCEDURE — 87491 CHLMYD TRACH DNA AMP PROBE: CPT

## 2023-10-18 PROCEDURE — G0463: CPT

## 2023-10-18 PROCEDURE — 87591 N.GONORRHOEAE DNA AMP PROB: CPT

## 2023-10-18 PROCEDURE — 86780 TREPONEMA PALLIDUM: CPT

## 2023-10-18 PROCEDURE — 90471 IMMUNIZATION ADMIN: CPT

## 2023-10-18 PROCEDURE — 36415 COLL VENOUS BLD VENIPUNCTURE: CPT

## 2023-10-18 PROCEDURE — 87536 HIV-1 QUANT&REVRSE TRNSCRPJ: CPT

## 2023-10-18 PROCEDURE — 80053 COMPREHEN METABOLIC PANEL: CPT

## 2023-10-19 DIAGNOSIS — Z11.3 ENCOUNTER FOR SCREENING FOR INFECTIONS WITH A PREDOMINANTLY SEXUAL MODE OF TRANSMISSION: ICD-10-CM

## 2023-10-19 DIAGNOSIS — Z29.9 ENCOUNTER FOR PROPHYLACTIC MEASURES, UNSPECIFIED: ICD-10-CM

## 2023-10-19 LAB
ALBUMIN SERPL ELPH-MCNC: 5 G/DL
ALP BLD-CCNC: 63 U/L
ALT SERPL-CCNC: 22 U/L
ANION GAP SERPL CALC-SCNC: 13 MMOL/L
APPEARANCE: CLEAR
AST SERPL-CCNC: 35 U/L
BASOPHILS # BLD AUTO: 0.04 K/UL
BASOPHILS NFR BLD AUTO: 0.7 %
BILIRUB SERPL-MCNC: 0.5 MG/DL
BILIRUBIN URINE: NEGATIVE
BLOOD URINE: NEGATIVE
BUN SERPL-MCNC: 15 MG/DL
C TRACH RRNA SPEC QL NAA+PROBE: NOT DETECTED
CALCIUM SERPL-MCNC: 10.2 MG/DL
CHLORIDE SERPL-SCNC: 101 MMOL/L
CO2 SERPL-SCNC: 24 MMOL/L
COLOR: YELLOW
CREAT SERPL-MCNC: 0.91 MG/DL
EGFR: 114 ML/MIN/1.73M2
EOSINOPHIL # BLD AUTO: 0.16 K/UL
EOSINOPHIL NFR BLD AUTO: 2.8 %
GLUCOSE QUALITATIVE U: NEGATIVE MG/DL
GLUCOSE SERPL-MCNC: 88 MG/DL
HCT VFR BLD CALC: 47.1 %
HGB BLD-MCNC: 15.3 G/DL
HIV1 RNA # SERPL NAA+PROBE: NORMAL
HIV1 RNA # SERPL NAA+PROBE: NORMAL COPIES/ML
HIV1+2 AB SPEC QL IA.RAPID: NONREACTIVE
IMM GRANULOCYTES NFR BLD AUTO: 0.4 %
KETONES URINE: NEGATIVE MG/DL
LEUKOCYTE ESTERASE URINE: NEGATIVE
LYMPHOCYTES # BLD AUTO: 1.19 K/UL
LYMPHOCYTES NFR BLD AUTO: 21 %
MAN DIFF?: NORMAL
MCHC RBC-ENTMCNC: 30.2 PG
MCHC RBC-ENTMCNC: 32.5 GM/DL
MCV RBC AUTO: 92.9 FL
MONOCYTES # BLD AUTO: 0.44 K/UL
MONOCYTES NFR BLD AUTO: 7.8 %
N GONORRHOEA RRNA SPEC QL NAA+PROBE: NOT DETECTED
NEUTROPHILS # BLD AUTO: 3.81 K/UL
NEUTROPHILS NFR BLD AUTO: 67.3 %
NITRITE URINE: NEGATIVE
PH URINE: 7.5
PLATELET # BLD AUTO: 313 K/UL
POTASSIUM SERPL-SCNC: 4.6 MMOL/L
PROT SERPL-MCNC: 7.6 G/DL
PROTEIN URINE: NEGATIVE MG/DL
RBC # BLD: 5.07 M/UL
RBC # FLD: 12.5 %
SODIUM SERPL-SCNC: 139 MMOL/L
SOURCE AMPLIFICATION: NORMAL
SOURCE ANAL: NORMAL
SOURCE ORAL: NORMAL
SPECIFIC GRAVITY URINE: 1.02
T PALLIDUM AB SER QL IA: NEGATIVE
UROBILINOGEN URINE: 0.2 MG/DL
VIRAL LOAD INTERP: NORMAL
VIRAL LOAD LOG: NORMAL LG COP/ML
WBC # FLD AUTO: 5.66 K/UL

## 2024-01-05 ENCOUNTER — LABORATORY RESULT (OUTPATIENT)
Age: 34
End: 2024-01-05

## 2024-01-05 ENCOUNTER — APPOINTMENT (OUTPATIENT)
Dept: PEDIATRIC ALLERGY IMMUNOLOGY | Facility: CLINIC | Age: 34
End: 2024-01-05

## 2024-01-05 DIAGNOSIS — Z29.9 ENCOUNTER FOR PROPHYLACTIC MEASURES, UNSPECIFIED: ICD-10-CM

## 2024-01-09 DIAGNOSIS — A74.9 CHLAMYDIAL INFECTION, UNSPECIFIED: ICD-10-CM

## 2024-01-12 ENCOUNTER — OUTPATIENT (OUTPATIENT)
Dept: OUTPATIENT SERVICES | Facility: HOSPITAL | Age: 34
LOS: 1 days | End: 2024-01-12
Payer: COMMERCIAL

## 2024-01-12 ENCOUNTER — APPOINTMENT (OUTPATIENT)
Dept: PEDIATRIC ALLERGY IMMUNOLOGY | Facility: CLINIC | Age: 34
End: 2024-01-12
Payer: COMMERCIAL

## 2024-01-12 DIAGNOSIS — Z00.00 ENCOUNTER FOR GENERAL ADULT MEDICAL EXAMINATION WITHOUT ABNORMAL FINDINGS: ICD-10-CM

## 2024-01-12 PROCEDURE — 96372 THER/PROPH/DIAG INJ SC/IM: CPT

## 2024-01-12 PROCEDURE — ZZZZZ: CPT

## 2024-01-12 RX ORDER — PENICILLIN G BENZATHINE 2400000 [IU]/4ML
2400000 INJECTION, SUSPENSION INTRAMUSCULAR
Qty: 0 | Refills: 0 | Status: COMPLETED | OUTPATIENT
Start: 2024-01-12

## 2024-01-12 RX ADMIN — PENICILLIN G BENZATHINE 0 UNIT/4ML: 2400000 INJECTION, SUSPENSION INTRAMUSCULAR at 00:00

## 2024-01-19 DIAGNOSIS — A51.9 EARLY SYPHILIS, UNSPECIFIED: ICD-10-CM

## 2024-02-13 ENCOUNTER — NON-APPOINTMENT (OUTPATIENT)
Age: 34
End: 2024-02-13

## 2024-04-08 ENCOUNTER — APPOINTMENT (OUTPATIENT)
Dept: PEDIATRIC ALLERGY IMMUNOLOGY | Facility: CLINIC | Age: 34
End: 2024-04-08

## 2024-04-12 ENCOUNTER — OUTPATIENT (OUTPATIENT)
Dept: OUTPATIENT SERVICES | Facility: HOSPITAL | Age: 34
LOS: 1 days | End: 2024-04-12
Payer: COMMERCIAL

## 2024-04-12 ENCOUNTER — APPOINTMENT (OUTPATIENT)
Dept: PEDIATRIC ALLERGY IMMUNOLOGY | Facility: CLINIC | Age: 34
End: 2024-04-12
Payer: COMMERCIAL

## 2024-04-12 VITALS
WEIGHT: 166 LBS | HEIGHT: 71 IN | OXYGEN SATURATION: 99 % | BODY MASS INDEX: 23.24 KG/M2 | SYSTOLIC BLOOD PRESSURE: 124 MMHG | DIASTOLIC BLOOD PRESSURE: 76 MMHG | HEART RATE: 81 BPM

## 2024-04-12 DIAGNOSIS — Z00.00 ENCOUNTER FOR GENERAL ADULT MEDICAL EXAMINATION WITHOUT ABNORMAL FINDINGS: ICD-10-CM

## 2024-04-12 DIAGNOSIS — A53.9 SYPHILIS, UNSPECIFIED: ICD-10-CM

## 2024-04-12 DIAGNOSIS — Z20.6 CONTACT WITH AND (SUSPECTED) EXPOSURE TO HUMAN IMMUNODEFICIENCY VIRUS [HIV]: ICD-10-CM

## 2024-04-12 DIAGNOSIS — E55.9 VITAMIN D DEFICIENCY, UNSPECIFIED: ICD-10-CM

## 2024-04-12 DIAGNOSIS — Z11.59 ENCOUNTER FOR SCREENING FOR OTHER VIRAL DISEASES: ICD-10-CM

## 2024-04-12 DIAGNOSIS — Z20.2 CONTACT WITH AND (SUSPECTED) EXPOSURE TO INFECTIONS WITH A PREDOMINANTLY SEXUAL MODE OF TRANSMISSION: ICD-10-CM

## 2024-04-12 DIAGNOSIS — Z11.3 ENCOUNTER FOR SCREENING FOR INFECTIONS WITH A PREDOMINANTLY SEXUAL MODE OF TRANSMISSION: ICD-10-CM

## 2024-04-12 DIAGNOSIS — Z29.81 ENCOUNTER FOR HIV PRE-EXPOSURE PROPHYLAXIS: ICD-10-CM

## 2024-04-12 PROCEDURE — 87389 HIV-1 AG W/HIV-1&-2 AB AG IA: CPT

## 2024-04-12 PROCEDURE — 87491 CHLMYD TRACH DNA AMP PROBE: CPT

## 2024-04-12 PROCEDURE — G0463: CPT

## 2024-04-12 PROCEDURE — 85025 COMPLETE CBC W/AUTO DIFF WBC: CPT

## 2024-04-12 PROCEDURE — 86593 SYPHILIS TEST NON-TREP QUANT: CPT

## 2024-04-12 PROCEDURE — 87536 HIV-1 QUANT&REVRSE TRNSCRPJ: CPT

## 2024-04-12 PROCEDURE — 99214 OFFICE O/P EST MOD 30 MIN: CPT

## 2024-04-12 PROCEDURE — 36415 COLL VENOUS BLD VENIPUNCTURE: CPT

## 2024-04-12 PROCEDURE — 87591 N.GONORRHOEAE DNA AMP PROB: CPT

## 2024-04-12 PROCEDURE — 80053 COMPREHEN METABOLIC PANEL: CPT

## 2024-04-12 PROCEDURE — G2211 COMPLEX E/M VISIT ADD ON: CPT

## 2024-04-12 RX ORDER — DOXYCYCLINE HYCLATE 100 MG/1
100 TABLET ORAL
Qty: 30 | Refills: 0 | Status: ACTIVE | COMMUNITY
Start: 2024-01-09 | End: 1900-01-01

## 2024-04-12 RX ORDER — EMTRICITABINE AND TENOFOVIR ALAFENAMIDE 200; 25 MG/1; MG/1
200-25 TABLET ORAL
Qty: 30 | Refills: 2 | Status: ACTIVE | COMMUNITY
Start: 2022-03-22 | End: 1900-01-01

## 2024-04-12 NOTE — SOCIAL HISTORY
[Sexually Active] : The patient is sexually active [Age of First Sexual [place] ___] : became sexually active at the age of [unfilled] [Always] : always [Oral-Receptive (pt. mouth)] : oral-receptive (pt. mouth) [Anal-Receptive (pt anus)] : anal-receptive (pt anus) [To Pt Anus] : pt anus [To Pt Penis] : pt penis [Male ___] : [unfilled] male [Date of most recent sexual encounter ___] : ~His/Her~ most recent sexual encounter was [unfilled] [Partner Aware?] : Partner Aware? Yes [Monogamous] : is not monogamous [de-identified] : Bottom

## 2024-04-12 NOTE — HISTORY OF PRESENT ILLNESS
[Excellent] : Excellent [Percent Adherence: _____ %] : [unfilled]% adherence [No] : No [FreeTextEntry1] : LAURA BATES is a 33 year old, male seen on 04/12/2024 for  PrEP 3 month f/u.   Adherence: In the past 3 months missed about 3 doses of PrEP. Taking in the morning.   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   Recent sexual history: Single, MSM, bottom.  In the past 1 month no new sexual partners.  Last sexual encounter was 12/2023  Typically meets sexual partners through apps.   No signs/symptoms of acute HIV. No fever, myalgias, throat pain, meningismus.

## 2024-04-15 LAB
ALBUMIN SERPL ELPH-MCNC: 4.9 G/DL
ALP BLD-CCNC: 41 U/L
ALT SERPL-CCNC: 15 U/L
ANION GAP SERPL CALC-SCNC: 12 MMOL/L
APPEARANCE: CLEAR
AST SERPL-CCNC: 19 U/L
BASOPHILS # BLD AUTO: 0.02 K/UL
BASOPHILS NFR BLD AUTO: 0.4 %
BILIRUB SERPL-MCNC: 0.5 MG/DL
BILIRUBIN URINE: NEGATIVE
BLOOD URINE: NEGATIVE
BUN SERPL-MCNC: 11 MG/DL
C TRACH RRNA SPEC QL NAA+PROBE: NOT DETECTED
CALCIUM SERPL-MCNC: 9.9 MG/DL
CHLORIDE SERPL-SCNC: 103 MMOL/L
CO2 SERPL-SCNC: 24 MMOL/L
COLOR: YELLOW
CREAT SERPL-MCNC: 0.93 MG/DL
EGFR: 110 ML/MIN/1.73M2
EOSINOPHIL # BLD AUTO: 0.06 K/UL
EOSINOPHIL NFR BLD AUTO: 1.2 %
GLUCOSE QUALITATIVE U: NEGATIVE MG/DL
GLUCOSE SERPL-MCNC: 93 MG/DL
HCT VFR BLD CALC: 45.6 %
HGB BLD-MCNC: 15.1 G/DL
HIV1 RNA # SERPL NAA+PROBE: NORMAL
HIV1 RNA # SERPL NAA+PROBE: NORMAL COPIES/ML
HIV1+2 AB SPEC QL IA.RAPID: NONREACTIVE
IMM GRANULOCYTES NFR BLD AUTO: 0.2 %
KETONES URINE: NEGATIVE MG/DL
LEUKOCYTE ESTERASE URINE: NEGATIVE
LYMPHOCYTES # BLD AUTO: 1.04 K/UL
LYMPHOCYTES NFR BLD AUTO: 20.7 %
MAN DIFF?: NORMAL
MCHC RBC-ENTMCNC: 29.7 PG
MCHC RBC-ENTMCNC: 33.1 GM/DL
MCV RBC AUTO: 89.6 FL
MONOCYTES # BLD AUTO: 0.35 K/UL
MONOCYTES NFR BLD AUTO: 7 %
N GONORRHOEA RRNA SPEC QL NAA+PROBE: NOT DETECTED
NEUTROPHILS # BLD AUTO: 3.54 K/UL
NEUTROPHILS NFR BLD AUTO: 70.5 %
NITRITE URINE: NEGATIVE
PH URINE: 7
PLATELET # BLD AUTO: 235 K/UL
POTASSIUM SERPL-SCNC: 4.3 MMOL/L
PROT SERPL-MCNC: 7.1 G/DL
PROTEIN URINE: NEGATIVE MG/DL
RBC # BLD: 5.09 M/UL
RBC # FLD: 13.6 %
RPR SER-TITR: ABNORMAL
SODIUM SERPL-SCNC: 140 MMOL/L
SOURCE AMPLIFICATION: NORMAL
SOURCE ANAL: NORMAL
SOURCE ORAL: NORMAL
SPECIFIC GRAVITY URINE: 1.02
UROBILINOGEN URINE: 0.2 MG/DL
VIRAL LOAD INTERP: NORMAL
VIRAL LOAD LOG: NORMAL LG COP/ML
WBC # FLD AUTO: 5.02 K/UL

## 2024-04-16 DIAGNOSIS — Z29.9 ENCOUNTER FOR PROPHYLACTIC MEASURES, UNSPECIFIED: ICD-10-CM

## 2024-07-10 ENCOUNTER — APPOINTMENT (OUTPATIENT)
Dept: PEDIATRIC ALLERGY IMMUNOLOGY | Facility: CLINIC | Age: 34
End: 2024-07-10

## 2024-07-10 DIAGNOSIS — Z29.81 ENCOUNTER FOR HIV PRE-EXPOSURE PROPHYLAXIS: ICD-10-CM

## 2024-11-13 ENCOUNTER — NON-APPOINTMENT (OUTPATIENT)
Age: 34
End: 2024-11-13